# Patient Record
(demographics unavailable — no encounter records)

---

## 2017-07-23 NOTE — EMERGENCY ROOM REPORT
History of Present Illness


General


Chief Complaint:  Choking


Source:  Caregiver





Present Illness


HPI


1 YO Male presents to the ED brought by father  c/o choking episode 45 mins 

PTA. father stated pt. was at [employer] and was running while eating pancake. 

pt. began coughing , turned blue, and father had to perform Heimlich maneuver. 

father states pt. had spontaneous return of color, and he visualized and 

removed large piece of pancake from pt. mouth. father wants pt. to get checked 

out to make sure he didn't inhale anything. denies changes in child's mentation 

or behavior. father denies pmhx. denies continued coughing from child. Denies, 

Listlessness, neck stiffness, increased lethargy, Labored breathing, wheezing, 

or uncontrollable high fevers.


Allergies:  


Coded Allergies:  


     No Known Allergies (Unverified , 4/23/16)





Patient History


Limited by:  age


Past Surgical History:  none


Birth History:  unknown


Pertinent Family History:  no significant inherited disorders


Social History:  in school


Immunizations:  UTD


Reviewed Nursing Documentation:  PMH: Agreed, PSxH: Agreed





Nursing Documentation-PMH


Hx Asthma:  Yes





Review of Systems


All Other Systems:  negative except mentioned in HPI





Physical Exam


Physical Exam





Vital Signs








  Date Time  Temp Pulse Resp B/P Pulse Ox O2 Delivery O2 Flow Rate FiO2


 


7/23/17 13:44 98.4 81 20 111/46 100 Room Air  








Sp02 EP Interpretation:  reviewed, normal


General Appearance:  no apparent distress, alert, non-toxic, normal 

attentiveness for age, normal consolability


Eyes:  bilateral eye PERRL, bilateral eye normal inspection


ENT:  TMs + canals normal, oropharynx normal, moist mucus membranes, no 

angioedema, no exudates, no erythma


Respiratory:  effort normal, no rhonchi, no wheezing, no retractions, chest 

symmetric, speaking in full sentences


Cardiovascular:  RRR, no murmur, gallop, rub


Skin:  normal inspection, no cyanosis/palor/diaphoresis, normal turgor, no rash

, other - 0.3cm small superficial laceration noted on pt. right ear that is 

scabbed.





Medical Decision Making


PA Attestation


Dr. Soriano  is my supervising Physician whom patient management has been 

discussed with.


Diagnostic Impression:  


 Primary Impression:  


 Choked on food


ER Course


Pt. presents to the ED c/o choking episode 45 mins PTA





Ddx considered but are not limited to retained tracheal FB, Aspiration, 

pneumonia...





Vital signs: are WNL, pt. is afebrile


H&PE are most consistent with normal appearing, non-toxic child., NAD  will r/o 

aspiration. 





ORDERS: 


- CXR 2 views: negative for infiltrate, atelectasis, foreign body, or enlarged 

cardiac silhouette, no tracheal fb noted per preliminary read by Dr. Soriano, 

his interpretation has been scribed by JOANNA Taylor. 





ED INTERVENTIONS: 


- Bacitracin for 0.3cm small superficial laceration noted on pt. right ear that 

is scabbed. 


DISCHARGE: At this time pt. is stable for d/c to home. Will provide printed 

patient care instructions, and any necessary prescriptions. Care plan and 

follow up instructions have been discussed with the patient prior to discharge.





Last Vital Signs








  Date Time  Temp Pulse Resp B/P Pulse Ox O2 Delivery O2 Flow Rate FiO2


 


7/23/17 13:44 98.4 81 20 111/46 100 Room Air  








Disposition:  HOME, SELF-CARE


Condition:  Stable


Referrals:  


NON PHYSICIAN (PCP)


Patient Instructions:  Choking, Pediatric





Additional Instructions:  


Take any previously prescribed medications as directed. 


 ** Follow up with a Primary Care Pediatrician in 3-5 days, even if your 

symptoms have resolved. ** 





Return sooner to ED if new symptoms occur, or current symptoms become worse. 











- Please note that this Emergency Department Report was dictated using [employer] technology software, occasionally this can lead to 

erroneous entry secondary to interpretation by the dictation equipment.











Ngozi Taylor Jul 23, 2017 14:56

## 2017-07-24 NOTE — DIAGNOSTIC IMAGING REPORT
Indication: Swallowed foreign body



Technique: One view of the chest



Comparison: 9/15/2016



Findings: No radiopaque foreign body demonstrated. Lungs and pleural spaces are

clear. Heart size is normal



Impression: Negative